# Patient Record
Sex: FEMALE | Race: ASIAN | NOT HISPANIC OR LATINO | Employment: UNEMPLOYED
[De-identification: names, ages, dates, MRNs, and addresses within clinical notes are randomized per-mention and may not be internally consistent; named-entity substitution may affect disease eponyms.]

---

## 2019-03-26 ENCOUNTER — RECORDS - HEALTHEAST (OUTPATIENT)
Dept: ADMINISTRATIVE | Facility: OTHER | Age: 39
End: 2019-03-26

## 2019-04-09 ENCOUNTER — RECORDS - HEALTHEAST (OUTPATIENT)
Dept: ADMINISTRATIVE | Facility: OTHER | Age: 39
End: 2019-04-09

## 2019-07-09 ENCOUNTER — RECORDS - HEALTHEAST (OUTPATIENT)
Dept: ADMINISTRATIVE | Facility: OTHER | Age: 39
End: 2019-07-09

## 2019-07-10 ENCOUNTER — COMMUNICATION - HEALTHEAST (OUTPATIENT)
Dept: ADMINISTRATIVE | Facility: CLINIC | Age: 39
End: 2019-07-10

## 2019-07-18 ENCOUNTER — AMBULATORY - HEALTHEAST (OUTPATIENT)
Dept: EDUCATION SERVICES | Facility: CLINIC | Age: 39
End: 2019-07-18

## 2019-07-18 DIAGNOSIS — O24.410 DIET CONTROLLED GESTATIONAL DIABETES MELLITUS (GDM), ANTEPARTUM: ICD-10-CM

## 2019-07-31 ENCOUNTER — AMBULATORY - HEALTHEAST (OUTPATIENT)
Dept: EDUCATION SERVICES | Facility: CLINIC | Age: 39
End: 2019-07-31

## 2019-07-31 DIAGNOSIS — O24.419 DM (DIABETES MELLITUS), GESTATIONAL, ANTEPARTUM: ICD-10-CM

## 2019-08-08 ENCOUNTER — AMBULATORY - HEALTHEAST (OUTPATIENT)
Dept: EDUCATION SERVICES | Facility: CLINIC | Age: 39
End: 2019-08-08

## 2019-08-08 ENCOUNTER — COMMUNICATION - HEALTHEAST (OUTPATIENT)
Dept: EDUCATION SERVICES | Facility: CLINIC | Age: 39
End: 2019-08-08

## 2019-08-08 DIAGNOSIS — O24.414 INSULIN CONTROLLED GESTATIONAL DIABETES MELLITUS (GDM) IN THIRD TRIMESTER: ICD-10-CM

## 2019-08-12 ENCOUNTER — COMMUNICATION - HEALTHEAST (OUTPATIENT)
Dept: ENDOCRINOLOGY | Facility: CLINIC | Age: 39
End: 2019-08-12

## 2019-08-12 DIAGNOSIS — O24.414 INSULIN CONTROLLED GESTATIONAL DIABETES MELLITUS (GDM) IN THIRD TRIMESTER: ICD-10-CM

## 2019-08-15 ENCOUNTER — AMBULATORY - HEALTHEAST (OUTPATIENT)
Dept: EDUCATION SERVICES | Facility: CLINIC | Age: 39
End: 2019-08-15

## 2019-08-15 DIAGNOSIS — O24.414 INSULIN CONTROLLED GESTATIONAL DIABETES MELLITUS (GDM) DURING PREGNANCY, ANTEPARTUM: ICD-10-CM

## 2019-08-22 ENCOUNTER — OFFICE VISIT - HEALTHEAST (OUTPATIENT)
Dept: ENDOCRINOLOGY | Facility: CLINIC | Age: 39
End: 2019-08-22

## 2019-08-22 DIAGNOSIS — O24.414 INSULIN CONTROLLED GESTATIONAL DIABETES MELLITUS (GDM) IN THIRD TRIMESTER: ICD-10-CM

## 2019-08-22 ASSESSMENT — MIFFLIN-ST. JEOR: SCORE: 1334.04

## 2019-08-28 ENCOUNTER — AMBULATORY - HEALTHEAST (OUTPATIENT)
Dept: MATERNAL FETAL MEDICINE | Facility: HOSPITAL | Age: 39
End: 2019-08-28

## 2019-08-28 ENCOUNTER — OFFICE VISIT - HEALTHEAST (OUTPATIENT)
Dept: MATERNAL FETAL MEDICINE | Facility: HOSPITAL | Age: 39
End: 2019-08-28

## 2019-08-28 ENCOUNTER — RECORDS - HEALTHEAST (OUTPATIENT)
Dept: ADMINISTRATIVE | Facility: OTHER | Age: 39
End: 2019-08-28

## 2019-08-28 ENCOUNTER — RECORDS - HEALTHEAST (OUTPATIENT)
Dept: ULTRASOUND IMAGING | Facility: HOSPITAL | Age: 39
End: 2019-08-28

## 2019-08-28 DIAGNOSIS — O26.90 PREGNANCY, ANTEPARTUM, COMPLICATIONS: ICD-10-CM

## 2019-08-28 DIAGNOSIS — O26.90 PREGNANCY RELATED CONDITIONS, UNSPECIFIED, UNSPECIFIED TRIMESTER: ICD-10-CM

## 2019-08-28 DIAGNOSIS — O24.414 INSULIN CONTROLLED GESTATIONAL DIABETES MELLITUS (GDM) IN THIRD TRIMESTER: ICD-10-CM

## 2019-08-28 DIAGNOSIS — O36.63X0 MACROSOMIA OF FETUS AFFECTING MANAGEMENT OF MOTHER IN THIRD TRIMESTER, SINGLE OR UNSPECIFIED FETUS: ICD-10-CM

## 2019-08-29 ENCOUNTER — AMBULATORY - HEALTHEAST (OUTPATIENT)
Dept: ENDOCRINOLOGY | Facility: CLINIC | Age: 39
End: 2019-08-29

## 2019-08-29 DIAGNOSIS — O24.414 INSULIN CONTROLLED GESTATIONAL DIABETES MELLITUS (GDM) IN THIRD TRIMESTER: ICD-10-CM

## 2019-09-03 ENCOUNTER — ANESTHESIA - HEALTHEAST (OUTPATIENT)
Dept: OBGYN | Facility: HOSPITAL | Age: 39
End: 2019-09-03

## 2019-09-04 ENCOUNTER — SURGERY - HEALTHEAST (OUTPATIENT)
Dept: OBGYN | Facility: HOSPITAL | Age: 39
End: 2019-09-04

## 2019-09-04 ASSESSMENT — MIFFLIN-ST. JEOR: SCORE: 1317.26

## 2019-09-06 ENCOUNTER — HOME CARE/HOSPICE - HEALTHEAST (OUTPATIENT)
Dept: HOME HEALTH SERVICES | Facility: HOME HEALTH | Age: 39
End: 2019-09-06

## 2021-05-30 NOTE — TELEPHONE ENCOUNTER
Date: 7/18/2019 Status: Mihai   Time: 3:00 PM Length: 60   Visit Type: CONSULT [[id number]] Copay: $0.00   Provider: Lindy Mustafa Diabetes Ed Department: [facility] DIABETES EDUCATION   Referring Provider:   ANABELLE: [id number]   Notes: Referring Provider: Dr. Brumfield  DX: Gestational Diabetes

## 2021-05-30 NOTE — TELEPHONE ENCOUNTER
External - [facility]   Referring Provider: Dr. Brumfield  DX: Gestational Diabetes     *Per patient best time to reach the patient is after 3pm    Ref./rec. Were received in DM consult folder on 07.09.2019 @ 4:49 & 4:58.

## 2021-05-30 NOTE — PATIENT INSTRUCTIONS - HE
1.will check blood glucose 4x/day: fasting and one hour after each meal.  2. Will check ketones every am upon waking.  3. Will continue with three meals/day.

## 2021-05-31 ENCOUNTER — RECORDS - HEALTHEAST (OUTPATIENT)
Dept: ADMINISTRATIVE | Facility: CLINIC | Age: 41
End: 2021-05-31

## 2021-05-31 NOTE — PROGRESS NOTES
In person  present for MFM appt. Jethro Smith. [place] [employer]. Did not provide an ID number.

## 2021-05-31 NOTE — TELEPHONE ENCOUNTER
Date: 8/22/2019 Status: Mihai   Time: 1:40 PM Length: 20   Visit Type: DE/ENDO OFFICE VISIT [[id number]] Copay: $0.00   Provider: Cindy Miner NP Department: [facility]   Referring Provider: DARLEEN PITTMAN CSN: [id number]   Notes: [facility] clinic

## 2021-05-31 NOTE — PROGRESS NOTES
[facility] GDM Care Plan    Assessment: pt seen today for f/u.  is present as well. Pt brings in BG log:   FB, 116, 128, 133, 114, 106, 122, 117  B: 176, 209, 141, 147, 175, 172, 164, 147  L: 176, 226, 130, 155, 216, 140, 143  D: 157, 205, 170, 175, 118, 151    Pt is eating 3 meals/day. She is usually eating about 1 cup of rice, some meat and veggies per each meal. She feels her meals are consistent in portions and very similar. She may sometimes have brothers noodles instead of rice. Pt reports ketones are WNL.   Discussed the importance of keeping BG under control. She confirms FBG are fasting and pp are about 1-1.5 hours after meals.   Insulin was sent on  to the pharmacy, pt states she was not able to . Called [facility] who claims they never received NPH and humalog is not covered. Per insurance it appears Basalgar and Admelog are covered. Sent in for Basaglar 10 units at HS and Admelog at 3 units prior to meals. Reviewed how to use insulin pens. Did not review titration instructions as I felt this would be too much for pt today. Reviewed signs and symptoms of hypoglycemia and treatment. Pt was not able to f/u in PC next week due to schedule. She will f/u with Lindy next week on 8/15 as scheduled and in PC  as scheduled.       Visit Type: GDM Individual Follow-up  Time: 30  Visit #: 3  Provider: Brissa   Provider's Diagnosis (per referral form): Gestational (648.83)  Weight: 154 lb 4.8 oz (70 kg)  OGTT: No results found for this or any previous visit.   Estimated Date of Delivery: 19  Pregnancy #:   Previous GDM: No   Medications:   Current Outpatient Medications:      HUMULIN N NPH INSULIN KWIKPEN 100 unit/mL (3 mL) pen, Give 8 units under the skin at bedtime., Disp: 5 adj dose pen, Rfl: PRN     insulin lispro (HUMALOG KWIKPEN INSULIN) 100 unit/mL pen, Inject 2 units under the skin with meals., Disp: 5 adj dose pen, Rfl: 1     pen needle, diabetic (BD ULTRA-FINE MAICOL PEN NEEDLE) 32  "gauge x 5/32\" Ndle, Use one pen needle per insulin injection, four times per day., Disp: 200 each, Rfl: 2    BG monitoring goals: Fasting <95; 1 hour post start of meal <140. Test 4 x per day.  Check fasting a.m. ketones: Yes  GDM meal pattern/carb counting taught per guidelines: Yes    Past Goals:  Nutrition: GDM meal plan MET  Activity: Walking after meals when able/staying active MET  Monitoring: BG 4x/day as directed, ketones every morning MET      New Goals:  Nutrition: GDM meal plan   Activity: Walking after meals when able/staying active   Monitoring: BG 4x/day as directed, ketones every morning    DIABETES CARE PLAN AND EDUCATION RECORD    Gestational Diabetes Disease Process/Preconception Care/Management During Pregnancy/Postpartum:Discussed    Meter (per above goals): Discussed    Nutrition Management    Weight: Assessed and Discussed  Portions/Balance: Assessed and Discussed  Carb ID/Count: Assessed and Discussed  Label Reading: Assessed and Discussed  Menu Planning: Assessed and Discussed  Dining Out: Assessed and Discussed  Physical Activity: Assessed and Discussed    Acute Complications: Prevent, Detect, Treat:    Goal Setting and Problem Solving: Assessed and Discussed  Barriers: Assessed and Discussed  Psychosocial Adjustments: Assessed and Discussed    I agree with the aforementioned diabetes plan.  Cindy HANKS [facility]  8/12/2019  12:05 PM      "

## 2021-05-31 NOTE — PROGRESS NOTES
[facility]  ENDOCRINOLOGY    Gestational Diabetes 2019    May Bisi, 1980, [id number]          Reason for visit      1. Insulin controlled gestational diabetes mellitus (GDM) in third trimester        HPI     May Bisi is a very pleasant 38 y.o. old female who presents for GESTATIONAL Diabetes Mellitus.  She is currently 37  weeks pregnant . Due date is 9/10/19  Diagnosed with GDM based on an OGTT. She hasnot had  GDM in prior pregnancies.   Current carbohydrate intake:consistent with recommendations of 30g-60g-60g.  I have reviewed her blood glucose logs and note that the:  Fasting readings  are:above range on current regimen  Postprandial readings are:in range on current regimen  Current Basaglar dose: 14  Current Prandial insulin:    Blood glucose logs/meter brought in and data reviewed and incorporated into decision-making.  Planned delivery at: [facility]  OBGYN: Rene Miranda    Therapy/Interventions in the past:  She has been seen by the Diabetes Educator- and has received instruction on carbohydrate counting and  consistency.  Records from referring provider and other sources have also been reviewed and incorporated into decision-making.      TODAY:  May is here today for the first and last time prior to Delivery, after starting insulin for GDM. She is here with a professional . She brings her log book and she continues to have elevations in FBS. Basaglar dosage will be increased.  She is having PP elevations, especially after larger rice portions, fried food, Ramen and Montse.  Directed to watch the former, and to take 10 units with the latter. Baby is moving appropriately and she is having some mild swelling. Postpartum instructions given and all questions answered to her satisfaction.     Past Medical History       Patient Active Problem List   Diagnosis     Acute Upper Respiratory Infection     Acute Pharyngitis     Anemia     Hydronephrosis        Past Surgical History     Past  Surgical History:   Procedure Laterality Date     DC  DELIVERY ONLY      Description:  Section;  Proc Date: 10/14/2010;  Comments: 1LTCS for Twins - baby B breech.       Family History     History reviewed. No pertinent family history.    Social History     Social History     Tobacco Use     Smoking status: Not on file   Substance Use Topics     Alcohol use: Not on file     Drug use: Not on file       Review of Systems     Patient has no polyuria or polydipsia, no chest pain, dyspnea or TIA's, no numbness, tingling or pain in extremities  Remainder negative except as noted in HPI.      Vital Signs     /72   Ht 5' (1.524 m)   Wt 163 lb 11.2 oz (74.3 kg)   BMI 31.97 kg/m    Wt Readings from Last 3 Encounters:   19 163 lb 11.2 oz (74.3 kg)   08/15/19 158 lb (71.7 kg)   19 154 lb 4.8 oz (70 kg)       Physical Exam     GENERAL: Pleasant, alert, appropriate appearance for age. No acute distress,   HEENT: Normocephalic, atraumatic  NECK: Supple, no masses or  lymph nodes.  THYROID: No nodules or enlargement. Non-tender, no bruit  CHEST/RESPIRATORY: Normal chest wall and respirations. Clear to auscultation.  CARDIOVASCULAR: Regular rate and rhythm. S1, S2, no murmur, click, gallop, or rubs.  ABDOMEN: Gravid   LYMPHATIC: No palpable nodes in neck, supraclavicular,  SKIN: No melanosis,  ecchymosis,  vitiligo. No acanthosis nigricans  NEURO:  Non-focal, normal DTRs; no tremor.  PSYCH: Alert and oriented -appropriate affect. Orientation, judgement and memory appear intact.  MSK: No joint abnormalities, FROM in all four extremities. No kyphosis    Assessment     1. Insulin controlled gestational diabetes mellitus (GDM) in third trimester        Plan     1. GESTATIONAL DIABETES-  Adjust dose as follows:    -Basaglar [id number]   units. Increase by 2 units every 2 days to keep fasting blood glucose below 95mg/dL  -Novolog 6  units with breakfast  -Novolog 6 units with lunch   -Novolog 6 units  with dinner  Will increase to 10 units with Montse or Livan fu  -Increase by 2 units every 2 days to keep 1 hour after meal blood glucose less than 140mg/dL    We reviewed glucose goals of fasting blood glucose <95 mg/dL and 1 hour post prandial blood glucose of <140 mg/dL.    Monitor blood sugar 4 times daily: Fasting  and 1 hour after each meal.  Contact  this clinic [phone] if blood glucose is not within the above-mentioned goals.     We discussed the importance of excellent glycemic control during pregnancy to limit complications such as fetal macrosomia, shoulder dystocia,  hypoglycemia and hyperbilirubinemia.  I have discussed the patient's increased risk of recurrent GDM and/or development of type 2 diabetes later in life.        F/up with A1c 3 months postpartum with PCP.    May be a candidate for metformin postpartum as she has more than 1 risk factor for future Type 2 Diabetes Mellitus.    She will need a screening A1c at least annually, TLC- including carbohydrate control and exercise.    After you deliver the baby, it is suggested to check your blood sugar occasionally (1 - 2 times per week) for 6 weeks.     When you are not pregnant, normal blood sugars are:       Fasting (before eating anything in the morning)  - under 100 mg/dl    2 hours after meals under 140  The American Diabetes Association recommends:     a glucose tolerance test 6 weeks after you deliver the baby.         a hemoglobin Alc test yearly after delivery.  The Alc test is a 2-3 month average blood sugar reading.        Discuss getting these tests with your provider.       After delivery, and your provider has said that it is safe to be active, work toward getting 150 minutes each week of physical activity to decrease your risk of  getting type 2 diabetes.       Maintaining a healthy body weight will also decrease your risk of getting type 2 diabetes.           Cindy Miner  2019      Lab Results     Creatinine  "  Date Value Ref Range Status   11/16/2016 0.63 0.60 - 1.10 mg/dL Final   10/14/2010 0.56 (L) 0.60 - 1.10 mg/dL Final       No results found for: CHOL, HDL, LDLCALC, TRIG    No results found for: ALT, AST, GGT, ALKPHOS, BILITOT      Current Medications     Outpatient Medications Prior to Visit   Medication Sig Dispense Refill     blood glucose test (CONTOUR NEXT TEST STRIPS) strips Use 1 each As Directed 4 (four) times a day. 150 strip 1     generic lancets Use 1 each As Directed 4 (four) times a day. 100 each 1     insulin aspart U-100 (NOVOLOG PENFILL U-100 INSULIN) 100 unit/mL Crtg Inject 3 Units under the skin 3 (three) times a day. (Patient taking differently: Inject 6 Units under the skin 3 (three) times a day.       ) 5 mL 0     pen needle, diabetic (BD ULTRA-FINE MAICOL PEN NEEDLE) 32 gauge x 5/32\" Ndle Use one pen needle per insulin injection, four times per day. 200 each 2     insulin glargine (LANTUS; BASAGLAR) 100 unit/mL (3 mL) pen Inject 10 Units under the skin at bedtime. (Patient taking differently: Inject 14 Units under the skin at bedtime.       ) 5 adj dose pen 0     insulin lispro (ADMELOG SOLOSTAR U-100 INSULIN) 100 unit/mL pen Inject 3 Units under the skin 3 (three) times a day. Before meals 5 adj dose pen 0     No facility-administered medications prior to visit.        "

## 2021-05-31 NOTE — PROGRESS NOTES
NST performed due to BPP of 6/8.  Dr. Weiss reviewed efm tracing. See NST/BPP Doc Flowsheet tab.

## 2021-05-31 NOTE — PROGRESS NOTES
"Please see \"Imaging\" tab under \"Chart Review\" for details of today's visit.    Pina Weiss        "

## 2021-06-01 ENCOUNTER — RECORDS - HEALTHEAST (OUTPATIENT)
Dept: ADMINISTRATIVE | Facility: CLINIC | Age: 41
End: 2021-06-01

## 2021-06-01 NOTE — ANESTHESIA PROCEDURE NOTES
Peripheral Block    Patient location during procedure: OR  Start time: 9/4/2019 2:09 PM  End time: 9/4/2019 2:16 PM  post-op analgesia per surgeon order as noted in medical record  Staffing:  Performing  Anesthesiologist: Olga Lundy MD  Preanesthetic Checklist  Completed: patient identified, site marked, risks, benefits, and alternatives discussed, timeout performed, consent obtained, airway assessed, oxygen available, suction available, emergency drugs available and hand hygiene performed  Peripheral Block  Block type: other, TAP  Prep: ChloraPrep  Patient position: supine  Laterality: bilateral, same technique used bilaterally  Injection technique: ultrasound guided      Permanent ultrasound image captured for medical record  Sterile gel and probe cover used for ultrasound.    Needle  Needle type: Stimuplex   Needle gauge: 21 G  Needle length: 4 in  no peripheral nerve catheter placed  Assessment  Injection assessment: no difficulty with injection, negative aspiration for heme and incremental injection

## 2021-06-01 NOTE — ANESTHESIA CARE TRANSFER NOTE
Last vitals:   Vitals:    09/04/19 1441   BP: 99/57   Pulse: 73   Resp: 16   Temp: 36.2  C (97.2  F)   SpO2: 100%     Patient's level of consciousness is drowsy  Spontaneous respirations: yes  Maintains airway independently: yes  Dentition unchanged: yes  Oropharynx: oropharynx clear of all foreign objects    QCDR Measures:  ASA# 20 - Surgical Safety Checklist: WHO surgical safety checklist completed prior to induction    PQRS# 430 - Adult PONV Prevention: 4558F - Pt received => 2 anti-emetic agents (different classes) preop & intraop  ASA# 8 - Peds PONV Prevention: NA - Not pediatric patient, not GA or 2 or more risk factors NOT present  PQRS# 424 - Nathaly-op Temp Management: 4559F - At least one body temp DOCUMENTED => 35.5C or 95.9F within required timeframe  PQRS# 426 - PACU Transfer Protocol: - Transfer of care checklist used  ASA# 14 - Acute Post-op Pain: ASA14B - Patient did NOT experience pain >= 7 out of 10

## 2021-06-01 NOTE — ANESTHESIA PROCEDURE NOTES
Spinal Block    Patient location during procedure: OR  Start time: 9/4/2019 1:40 PM  End time: 9/4/2019 1:48 PM  Reason for block: primary anesthetic    Staffing:  Performing  Anesthesiologist: Olga Lundy MD    Preanesthetic Checklist  Completed: patient identified, risks, benefits, and alternatives discussed, timeout performed, consent obtained, airway assessed, oxygen available, suction available, emergency drugs available and hand hygiene performed  Spinal Block  Patient position: sitting  Prep: ChloraPrep and site prepped and draped  Patient monitoring: heart rate, continuous pulse ox, blood pressure and cardiac monitor  Approach: midline  Location: L3-4  Injection technique: single-shot  Needle type: pencil-tip   Needle gauge: 24 G    Assessment  Sensory level: T4

## 2021-06-01 NOTE — ANESTHESIA POSTPROCEDURE EVALUATION
Patient: May Bisi   SECTION, REPEAT  Anesthesia type: spinal    Patient location: Labor and Delivery  Last vitals:   Vitals Value Taken Time   /75 2019  6:08 PM   Temp 36.2  C (97.2  F) 2019  2:41 PM   Pulse 72 2019  9:20 PM   Resp 16 2019  6:08 PM   SpO2 96 % 2019  9:20 PM   Vitals shown include unvalidated device data.  Post vital signs: stable  Level of consciousness: awake and responds to simple questions  Post-anesthesia pain: pain controlled  Post-anesthesia nausea and vomiting: no  Pulmonary: unassisted, return to baseline  Cardiovascular: stable and blood pressure at baseline  Hydration: adequate  Anesthetic events: no    QCDR Measures:  ASA# 11 - Nathaly-op Cardiac Arrest: ASA11B - Patient did NOT experience unanticipated cardiac arrest  ASA# 12 - Nathaly-op Mortality Rate: ASA12B - Patient did NOT die  ASA# 13 - PACU Re-Intubation Rate: ASA13B - Patient did NOT require a new airway mgmt  ASA# 10 - Composite Anes Safety: ASA10A - No serious adverse event    Additional Notes:    Neuraxial block has worn off, no residual numbness or weakness. Pain controlled and reports 0/10. Endorses some nausea. Denies headache or back pain. No further questions or concerns. Instructed that we are available  should she have questions pertaining to her spinal.

## 2021-06-01 NOTE — ANESTHESIA PREPROCEDURE EVALUATION
Anesthesia Evaluation      Patient summary reviewed   No history of anesthetic complications     Airway   Mallampati: II  Neck ROM: full   Pulmonary - negative ROS and normal exam    breath sounds clear to auscultation  (-) not a smoker                         Cardiovascular - negative ROS and normal exam  (-) hypertension  Rhythm: regular  Rate: normal,         Neuro/Psych - negative ROS     Endo/Other    (+) diabetes mellitus using insulin, pregnant  (-) no obesity     Comments: GDM    GI/Hepatic/Renal    (+) GERD,   chronic renal disease,           Dental - normal exam                        Anesthesia Plan  Planned anesthetic: peripheral nerve block and spinal    ASA 2   Induction: intravenous   Anesthetic plan and risks discussed with: patient, spouse, child/children and  services used  Anesthesia plan special considerations: antiemetics,   Post-op plan: other (ITN orders)

## 2021-06-02 ENCOUNTER — RECORDS - HEALTHEAST (OUTPATIENT)
Dept: ADMINISTRATIVE | Facility: CLINIC | Age: 41
End: 2021-06-02

## 2021-06-03 VITALS — WEIGHT: 160 LBS | BODY MASS INDEX: 31.41 KG/M2 | HEIGHT: 60 IN

## 2021-06-03 VITALS — BODY MASS INDEX: 32.14 KG/M2 | WEIGHT: 163.7 LBS | HEIGHT: 60 IN

## 2021-06-03 VITALS — WEIGHT: 154.3 LBS

## 2021-06-03 VITALS — WEIGHT: 155 LBS

## 2021-06-03 VITALS — WEIGHT: 158 LBS

## 2021-06-16 PROBLEM — O33.9 CPD (CEPHALO-PELVIC DISPROPORTION): Status: ACTIVE | Noted: 2019-09-04

## 2021-06-19 NOTE — LETTER
Letter by Cait Mckeon RN at      Author: Cait Mckeon RN Service: -- Author Type: --    Filed:  Encounter Date: 8/8/2019 Status: (Other)                    August 8, 2019    Patient: Kaleigh Mathews   MR Number: [id number]   YOB: 1980   Date of Visit: 8/8/2019     To whom it may concern,     Patient will need at least weekly clinic appointments, possibly more often. She is being seen for gestational diabetes which is requiring frequent visits to adjust insulin and make sure blood sugars are well controlled for the health of herself and baby.         Cait Mckeon RN, CDE

## 2021-07-03 NOTE — ADDENDUM NOTE
Addendum Note by Abdullahi Arrieta Diabetes Ed at 7/31/2019  3:00 PM     Author: Abdullahi Arrieta Diabetes Ed Service: -- Author Type: Diabetes Ed    Filed: 8/1/2019 12:34 PM Encounter Date: 7/31/2019 Status: Signed    : Abdullahi Arrieta Diabetes Ed (Diabetes Ed)    Addended by: ABDULLAHI ARRIETA on: 8/1/2019 12:34 PM        Modules accepted: Orders

## 2021-07-03 NOTE — ADDENDUM NOTE
Addendum Note by David Miner NP at 7/31/2019  3:00 PM     Author: David Miner NP Service: -- Author Type: Nurse Practitioner    Filed: 8/1/2019 11:55 AM Encounter Date: 7/31/2019 Status: Signed    : David Miner NP (Nurse Practitioner)    Addended by: DAVID MINER on: 8/1/2019 11:55 AM        Modules accepted: Orders

## 2021-07-03 NOTE — ADDENDUM NOTE
Addendum Note by Abdullahi Arrieta Diabetes Ed at 7/31/2019  3:00 PM     Author: Abdullahi Arrieta Diabetes Ed Service: -- Author Type: Diabetes Ed    Filed: 8/1/2019 10:11 AM Encounter Date: 7/31/2019 Status: Signed    : Abdullahi Arrieta Diabetes Ed (Diabetes Ed)    Addended by: ABDULLAHI ARRIETA on: 8/1/2019 10:11 AM        Modules accepted: Orders

## 2023-10-16 ENCOUNTER — OFFICE VISIT (OUTPATIENT)
Dept: FAMILY MEDICINE | Facility: CLINIC | Age: 43
End: 2023-10-16
Payer: COMMERCIAL

## 2023-10-16 VITALS
HEART RATE: 80 BPM | SYSTOLIC BLOOD PRESSURE: 135 MMHG | WEIGHT: 142 LBS | DIASTOLIC BLOOD PRESSURE: 95 MMHG | OXYGEN SATURATION: 97 % | BODY MASS INDEX: 27.73 KG/M2 | TEMPERATURE: 98.5 F | RESPIRATION RATE: 18 BRPM

## 2023-10-16 DIAGNOSIS — Z30.9 ENCOUNTER FOR CONTRACEPTIVE MANAGEMENT, UNSPECIFIED TYPE: ICD-10-CM

## 2023-10-16 DIAGNOSIS — R35.0 URINARY FREQUENCY: Primary | ICD-10-CM

## 2023-10-16 PROCEDURE — 99203 OFFICE O/P NEW LOW 30 MIN: CPT | Performed by: STUDENT IN AN ORGANIZED HEALTH CARE EDUCATION/TRAINING PROGRAM

## 2023-10-16 NOTE — PROGRESS NOTES
Assessment & Plan     Urinary frequency  - Ob/Gyn  Referral; Future  - Physical Therapy Referral; Future    Encounter for contraceptive management, unspecified type  Patient would like to discuss tubal ligation.  She declines other contraception methods.  - Ob/Gyn  Referral; Future                 No follow-ups on file.    Serjio Gustafson MD  [facility]    Subjective   May is a 42 year old, presenting for the following health issues:  Bladder Problems (Feel like uterus is going down and can't hold pee and been going on  for 2 years ) and Other (Pregancy prevention)      10/16/2023     9:22 AM   Additional Questions   Roomed by ML   Accompanied by self       HPI           Patient is requesting referral to discuss tubal ligation.  She is done having children.  She declines medication, IUD and Nexplanon.     She reports pressure on her bladder which she is concerned for the uterus.  She is not interested in PT.  She does not have any dysuria or changes in urination.  No increase in frequency or incontinence.    Review of Systems   Positive ROS was noted in the HPI, otherwise negative.         Objective    BP (!) 135/95 (BP Location: Left arm, Patient Position: Sitting, Cuff Size: Adult Regular)   Pulse 80   Temp 98.5  F (36.9  C) (Oral)   Resp 18   Wt 64.4 kg (142 lb)   LMP 10/06/2023 (Exact Date)   SpO2 97%   BMI 27.73 kg/m    Body mass index is 27.73 kg/m .  Physical Exam  Constitutional:       Appearance: Normal appearance.   HENT:      Head: Normocephalic.      Nose: Nose normal.      Mouth/Throat:      Mouth: Mucous membranes are moist.   Eyes:      Extraocular Movements: Extraocular movements intact.      Conjunctiva/sclera: Conjunctivae normal.   Cardiovascular:      Rate and Rhythm: Normal rate and regular rhythm.   Pulmonary:      Effort: Pulmonary effort is normal.      Breath sounds: Normal breath sounds.   Abdominal:      Palpations: Abdomen is soft.    Musculoskeletal:      Cervical back: Normal range of motion.   Skin:     General: Skin is warm.      Capillary Refill: Capillary refill takes less than 2 seconds.   Neurological:      General: No focal deficit present.      Mental Status: She is alert.

## 2023-12-12 ENCOUNTER — OFFICE VISIT (OUTPATIENT)
Dept: OBGYN | Facility: CLINIC | Age: 43
End: 2023-12-12
Payer: COMMERCIAL

## 2023-12-12 ENCOUNTER — PREP FOR PROCEDURE (OUTPATIENT)
Dept: OBGYN | Facility: CLINIC | Age: 43
End: 2023-12-12

## 2023-12-12 ENCOUNTER — HOSPITAL ENCOUNTER (OUTPATIENT)
Facility: AMBULATORY SURGERY CENTER | Age: 43
End: 2023-12-12
Attending: OBSTETRICS & GYNECOLOGY
Payer: COMMERCIAL

## 2023-12-12 VITALS
WEIGHT: 144 LBS | HEIGHT: 60 IN | BODY MASS INDEX: 28.27 KG/M2 | SYSTOLIC BLOOD PRESSURE: 128 MMHG | OXYGEN SATURATION: 99 % | HEART RATE: 81 BPM | DIASTOLIC BLOOD PRESSURE: 80 MMHG

## 2023-12-12 DIAGNOSIS — Z30.09 UNWANTED FERTILITY: Primary | ICD-10-CM

## 2023-12-12 DIAGNOSIS — Z30.09 STERILIZATION CONSULT: ICD-10-CM

## 2023-12-12 PROCEDURE — 99203 OFFICE O/P NEW LOW 30 MIN: CPT | Performed by: OBSTETRICS & GYNECOLOGY

## 2024-01-24 NOTE — PROGRESS NOTES
CC: The patient is being seen secondary to a desire for no more pregnancies.    HPI: The pt is a 43 year old MAF  who presents with knowing she doesn't want any more children.  She is seen with a professional [facility]  on the phone.  She has had 2 prior  deliveries; the rest were vaginal.    No past medical history on file.    Past Surgical History:   Procedure Laterality Date     SECTION N/A 2019    Procedure:  SECTION, REPEAT;  Surgeon: Rhett Brumfield MD;  Location: [facility] OR;  Service: Obstetrics    [facility]  DELIVERY ONLY      Description:  Section;  Proc Date: 10/14/2010;  Comments: 1LTCS for Twins - baby B breech.       Patient's   Family History   Problem Relation Age of Onset    No Known Problems Mother     No Known Problems Father     No Known Problems Sister     No Known Problems Sister     No Known Problems Son     No Known Problems Son     No Known Problems Son     No Known Problems Son     No Known Problems Son     No Known Problems Daughter     No Known Problems Daughter     No Known Problems Daughter     No Known Problems Daughter        Patient   Social History     Socioeconomic History    Marital status:    Tobacco Use    Smoking status: Never    Smokeless tobacco: Never   Substance and Sexual Activity    Alcohol use: Never    Drug use: Never    Sexual activity: Yes     Partners: Male     Social Determinants of Health     Interpersonal Safety: Low Risk  (10/16/2023)    Interpersonal Safety     Do you feel physically and emotionally safe where you currently live?: Yes     Within the past 12 months, have you been hit, slapped, kicked or otherwise physically hurt by someone?: No     Within the past 12 months, have you been humiliated or emotionally abused in other ways by your partner or ex-partner?: No       Outpatient Medications Prior to Visit   Medication Sig Dispense Refill    ibuprofen (ADVIL,MOTRIN) 600 MG tablet [IBUPROFEN  (ADVIL,MOTRIN) 600 MG TABLET] Take 1 tablet (600 mg total) by mouth every 6 (six) hours as needed for pain. (Patient not taking: Reported on 10/16/2023) 30 tablet 0    oxyCODONE (ROXICODONE) 5 MG immediate release tablet [OXYCODONE (ROXICODONE) 5 MG IMMEDIATE RELEASE TABLET] Take 1 tablet (5 mg total) by mouth every 6 (six) hours as needed for pain. (Patient not taking: Reported on 10/16/2023) 12 tablet 0     No facility-administered medications prior to visit.       Patient has No Known Allergies.    ROS:  12 part ROS is negative aside from those symptoms in the HPI    PE:  /80 (BP Location: Right arm, Patient Position: Sitting, Cuff Size: Adult Regular)   Pulse 81   Ht 1.524 m (5')   Wt 65.3 kg (144 lb)   LMP 11/27/2023 (Exact Date)           Body mass index is 28.12 kg/m .    General: overweight AF, NAD  Psych: normal mood  Neuro: CN I-XII grossly intact  MS: normal gait    Assessment: 43 year old MAF  with a desire for permanent contraception.    Plan: LARCs were discussed with the patient.  Laparoscopic tubal ligation was discussed with the patient.  We discussed that most of the time the surgery is actually a bilateral salpingectomy to help reduce the risk of ovarian cancer.  She was counseled on the permanence of the procedure, the approximately 1/200 failure rate and the increased risk for ectopic should pregnancy occur.  She was counseled on the pros and cons of LTL including immediate effect but general anesthesia and 3 incisions.  Questions were answered.  She will be notified when the procedure is scheduled.  She was counseled on the need for someone to drive her home and stay with her after surgery, the need for a pre-op exam prior to the surgery, the need to have nothing to eat for 8 hour prior to surgery and nothing to drink after 2 hours prior to surgery, and that the surgery center would call her a day or two before the procedure to go over details of the process and what time to  arrive.  She signed the MA consent form today.

## 2024-02-16 ENCOUNTER — OFFICE VISIT (OUTPATIENT)
Dept: MIDWIFE SERVICES | Facility: CLINIC | Age: 44
End: 2024-02-16
Payer: COMMERCIAL

## 2024-02-16 VITALS — SYSTOLIC BLOOD PRESSURE: 128 MMHG | DIASTOLIC BLOOD PRESSURE: 84 MMHG

## 2024-02-16 DIAGNOSIS — Z86.32 HISTORY OF DIET CONTROLLED GESTATIONAL DIABETES MELLITUS (GDM): ICD-10-CM

## 2024-02-16 DIAGNOSIS — Z00.00 HEALTHCARE MAINTENANCE: ICD-10-CM

## 2024-02-16 DIAGNOSIS — Z01.818 PREOP GENERAL PHYSICAL EXAM: Primary | ICD-10-CM

## 2024-02-16 DIAGNOSIS — Z30.09 UNWANTED FERTILITY: ICD-10-CM

## 2024-02-16 DIAGNOSIS — Z11.3 SCREEN FOR STD (SEXUALLY TRANSMITTED DISEASE): ICD-10-CM

## 2024-02-16 PROBLEM — O33.9 CPD (CEPHALO-PELVIC DISPROPORTION): Status: RESOLVED | Noted: 2019-09-04 | Resolved: 2024-02-16

## 2024-02-16 LAB
ALBUMIN SERPL BCG-MCNC: 4.2 G/DL (ref 3.5–5.2)
ALP SERPL-CCNC: 162 U/L (ref 40–150)
ALT SERPL W P-5'-P-CCNC: 55 U/L (ref 0–50)
ANION GAP SERPL CALCULATED.3IONS-SCNC: 14 MMOL/L (ref 7–15)
AST SERPL W P-5'-P-CCNC: 48 U/L (ref 0–45)
BILIRUB SERPL-MCNC: 0.5 MG/DL
BUN SERPL-MCNC: 8.3 MG/DL (ref 6–20)
CALCIUM SERPL-MCNC: 9.4 MG/DL (ref 8.6–10)
CHLORIDE SERPL-SCNC: 100 MMOL/L (ref 98–107)
CREAT SERPL-MCNC: 0.44 MG/DL (ref 0.51–0.95)
DEPRECATED HCO3 PLAS-SCNC: 23 MMOL/L (ref 22–29)
EGFRCR SERPLBLD CKD-EPI 2021: >90 ML/MIN/1.73M2
ERYTHROCYTE [DISTWIDTH] IN BLOOD BY AUTOMATED COUNT: 11.8 % (ref 10–15)
GLUCOSE SERPL-MCNC: 371 MG/DL (ref 70–99)
HBA1C MFR BLD: 10.8 % (ref 0–5.6)
HCT VFR BLD AUTO: 40.2 % (ref 35–47)
HCV AB SERPL QL IA: NONREACTIVE
HGB BLD-MCNC: 13.9 G/DL (ref 11.7–15.7)
HIV 1+2 AB+HIV1 P24 AG SERPL QL IA: NONREACTIVE
MCH RBC QN AUTO: 28.7 PG (ref 26.5–33)
MCHC RBC AUTO-ENTMCNC: 34.6 G/DL (ref 31.5–36.5)
MCV RBC AUTO: 83 FL (ref 78–100)
PLATELET # BLD AUTO: 334 10E3/UL (ref 150–450)
POTASSIUM SERPL-SCNC: 3.7 MMOL/L (ref 3.4–5.3)
PROT SERPL-MCNC: 7.8 G/DL (ref 6.4–8.3)
RBC # BLD AUTO: 4.85 10E6/UL (ref 3.8–5.2)
SODIUM SERPL-SCNC: 137 MMOL/L (ref 135–145)
TSH SERPL DL<=0.005 MIU/L-ACNC: 1.74 UIU/ML (ref 0.3–4.2)
WBC # BLD AUTO: 8.3 10E3/UL (ref 4–11)

## 2024-02-16 PROCEDURE — 87591 N.GONORRHOEAE DNA AMP PROB: CPT | Performed by: ADVANCED PRACTICE MIDWIFE

## 2024-02-16 PROCEDURE — 84443 ASSAY THYROID STIM HORMONE: CPT | Performed by: ADVANCED PRACTICE MIDWIFE

## 2024-02-16 PROCEDURE — 87491 CHLMYD TRACH DNA AMP PROBE: CPT | Performed by: ADVANCED PRACTICE MIDWIFE

## 2024-02-16 PROCEDURE — 36415 COLL VENOUS BLD VENIPUNCTURE: CPT | Performed by: ADVANCED PRACTICE MIDWIFE

## 2024-02-16 PROCEDURE — 99215 OFFICE O/P EST HI 40 MIN: CPT | Performed by: ADVANCED PRACTICE MIDWIFE

## 2024-02-16 PROCEDURE — 83036 HEMOGLOBIN GLYCOSYLATED A1C: CPT | Performed by: ADVANCED PRACTICE MIDWIFE

## 2024-02-16 PROCEDURE — 80053 COMPREHEN METABOLIC PANEL: CPT | Performed by: ADVANCED PRACTICE MIDWIFE

## 2024-02-16 PROCEDURE — 85027 COMPLETE CBC AUTOMATED: CPT | Performed by: ADVANCED PRACTICE MIDWIFE

## 2024-02-16 PROCEDURE — 86803 HEPATITIS C AB TEST: CPT | Performed by: ADVANCED PRACTICE MIDWIFE

## 2024-02-16 PROCEDURE — 87389 HIV-1 AG W/HIV-1&-2 AB AG IA: CPT | Performed by: ADVANCED PRACTICE MIDWIFE

## 2024-02-16 NOTE — PROGRESS NOTES
Preoperative Evaluation  [address]  [facility] [address]  Phone: [phone]  Fax: [phone]  Primary Provider: Serjio Gustafson  Pre-op Performing Provider:    MEENA LOPEZ,   Feb 16, 2024       May is a 43 year old, presenting for the following:  Pre-Op Exam      Surgical Information  Surgery/Procedure: Bilateral salpingectomy  Surgery Location: [facility]  Surgeon: Dr. Quigley  Surgery Date: 02/28/2024  Time of Surgery: 9:15 AM  Where patient plans to recover: At home with family  Fax number for surgical facility: Note does not need to be faxed, will be available electronically in Epic.    Assessment & Plan     The proposed surgical procedure is considered LOW risk.    Problem List Items Addressed This Visit          Urinary    Unwanted fertility       Other    History of diet controlled gestational diabetes mellitus (GDM)    Relevant Orders    Hemoglobin A1c (Completed)     Other Visit Diagnoses       Preop general physical exam    -  Primary    Relevant Orders    CBC with platelets (Completed)    Comprehensive metabolic panel (BMP + Alb, Alk Phos, ALT, AST, Total. Bili, TP)    TSH with free T4 reflex    Hepatitis C antibody    HIV Antigen Antibody Combo    *MA Screening Digital Bilateral    Healthcare maintenance        Relevant Orders    TSH with free T4 reflex    Hepatitis C antibody    HIV Antigen Antibody Combo    *MA Screening Digital Bilateral    Hemoglobin A1c (Completed)    Screen for STD (sexually transmitted disease)        Relevant Orders    Chlamydia trachomatis/Neisseria gonorrhoeae by PCR - Clinic Collect          Patient is due for her cervical cancer screening and is currently having her menstrual period.  She will RTC in 1 week for Pap smear with HPV and wet prep.     - No identified additional risk factors other than previously addressed      Recommendation  APPROVAL GIVEN to proceed  with proposed procedure, without further diagnostic evaluation.    Ordering of each unique test  45 minutes spent by me on the date of the encounter doing chart review, history and exam, documentation and further activities per the note    Subjective       HPI related to upcoming procedure: Patient desires permanent sterilization           No data to display                  Status of Chronic Conditions:  See problem list for active medical problems.  Problems all longstanding and stable, except as noted/documented.  See ROS for pertinent symptoms related to these conditions.    Patient Active Problem List    Diagnosis Date Noted    History of diet controlled gestational diabetes mellitus (GDM) 2024     Priority: Medium     2019      Unwanted fertility 2023     Priority: Medium      Past Medical History:   Diagnosis Date    Anemia     GDM (gestational diabetes mellitus)      Past Surgical History:   Procedure Laterality Date     SECTION N/A 2019    Procedure:  SECTION, REPEAT;  Surgeon: Rhett Brumfield MD;  Location: [facility] OR;  Service: Obstetrics    [facility]  DELIVERY ONLY      Description:  Section;  Proc Date: 10/14/2010;  Comments: 1LTCS for Twins - baby B breech.     Current Outpatient Medications   Medication Sig Dispense Refill    ibuprofen (ADVIL,MOTRIN) 600 MG tablet [IBUPROFEN (ADVIL,MOTRIN) 600 MG TABLET] Take 1 tablet (600 mg total) by mouth every 6 (six) hours as needed for pain. (Patient not taking: Reported on 10/16/2023) 30 tablet 0    oxyCODONE (ROXICODONE) 5 MG immediate release tablet [OXYCODONE (ROXICODONE) 5 MG IMMEDIATE RELEASE TABLET] Take 1 tablet (5 mg total) by mouth every 6 (six) hours as needed for pain. (Patient not taking: Reported on 10/16/2023) 12 tablet 0       No Known Allergies     Social History     Tobacco Use    Smoking status: Never    Smokeless tobacco: Never   Substance Use Topics    Alcohol use: Never     Family  "History   Problem Relation Age of Onset    No Known Problems Mother     No Known Problems Father     No Known Problems Sister     No Known Problems Sister     No Known Problems Son     No Known Problems Son     No Known Problems Son     No Known Problems Son     No Known Problems Son     No Known Problems Daughter     No Known Problems Daughter     No Known Problems Daughter     No Known Problems Daughter      History   Drug Use Unknown         Review of Systems    Review of Systems  Constitutional, HEENT, cardiovascular, pulmonary, gi and gu systems are negative, except as otherwise noted.  Objective    /84 (BP Location: Right arm, Patient Position: Sitting, Cuff Size: Adult Regular)   LMP 02/16/2024 (Exact Date)    Estimated body mass index is 28.12 kg/m  as calculated from the following:    Height as of 12/12/23: 1.524 m (5').    Weight as of 12/12/23: 65.3 kg (144 lb).  Physical Exam  GENERAL: alert and no distress.  Patient has a right nostril piercing, 1 left earlobe piercing, and 3 right earlobe piercings.  EYES: Eyes grossly normal to inspection, PERRL and conjunctivae and sclerae normal  HENT: ear canals and TM's normal, nose and mouth without ulcers or lesions  NECK: no adenopathy, no asymmetry, masses, or scars  RESP: lungs clear to auscultation - no rales, rhonchi or wheezes  CV: regular rate and rhythm, normal S1 S2, no S3 or S4, no murmur, click or rub, no peripheral edema  ABDOMEN: soft, nontender, no hepatosplenomegaly, no masses and bowel sounds normal  MS: no gross musculoskeletal defects noted, no edema  SKIN: no suspicious lesions or rashes  NEURO: Normal strength and tone, mentation intact and speech normal  PSYCH: mentation appears normal, affect normal/bright    No results for input(s): \"HGB\", \"PLT\", \"INR\", \"NA\", \"POTASSIUM\", \"CR\", \"A1C\" in the last 70294 hours.     Diagnostics  Labs pending at this time.  Results will be reviewed when available.   No EKG required for low risk surgery " (cataract, skin procedure, breast biopsy, etc).    Revised Cardiac Risk Index (RCRI)  The patient has the following serious cardiovascular risks for perioperative complications:   - No serious cardiac risks = 0 points     RCRI Interpretation: 0 points: Class I (very low risk - 0.4% complication rate)         Signed Electronically by: Olga Field CNM  Copy of this evaluation report is provided to requesting physician.

## 2024-02-17 DIAGNOSIS — R73.9 ELEVATED BLOOD SUGAR: ICD-10-CM

## 2024-02-17 DIAGNOSIS — R73.09 ELEVATED HEMOGLOBIN A1C: Primary | ICD-10-CM

## 2024-02-17 LAB
C TRACH DNA SPEC QL PROBE+SIG AMP: NEGATIVE
N GONORRHOEA DNA SPEC QL NAA+PROBE: NEGATIVE

## 2024-02-20 ENCOUNTER — OFFICE VISIT (OUTPATIENT)
Dept: FAMILY MEDICINE | Facility: CLINIC | Age: 44
End: 2024-02-20
Payer: COMMERCIAL

## 2024-02-20 VITALS
OXYGEN SATURATION: 95 % | DIASTOLIC BLOOD PRESSURE: 81 MMHG | WEIGHT: 138.6 LBS | BODY MASS INDEX: 27.07 KG/M2 | SYSTOLIC BLOOD PRESSURE: 123 MMHG | RESPIRATION RATE: 24 BRPM | HEART RATE: 118 BPM | TEMPERATURE: 101.8 F

## 2024-02-20 DIAGNOSIS — J10.1 INFLUENZA B: Primary | ICD-10-CM

## 2024-02-20 DIAGNOSIS — R07.0 THROAT PAIN: ICD-10-CM

## 2024-02-20 DIAGNOSIS — R50.9 FEVER, UNSPECIFIED FEVER CAUSE: ICD-10-CM

## 2024-02-20 LAB
DEPRECATED S PYO AG THROAT QL EIA: NEGATIVE
FLUAV AG SPEC QL IA: NEGATIVE
FLUBV AG SPEC QL IA: POSITIVE
GROUP A STREP BY PCR: NOT DETECTED

## 2024-02-20 PROCEDURE — 99213 OFFICE O/P EST LOW 20 MIN: CPT | Performed by: PHYSICIAN ASSISTANT

## 2024-02-20 PROCEDURE — 87651 STREP A DNA AMP PROBE: CPT | Performed by: PHYSICIAN ASSISTANT

## 2024-02-20 PROCEDURE — 87804 INFLUENZA ASSAY W/OPTIC: CPT | Performed by: PHYSICIAN ASSISTANT

## 2024-02-20 RX ORDER — OSELTAMIVIR PHOSPHATE 75 MG/1
75 CAPSULE ORAL 2 TIMES DAILY
Qty: 10 CAPSULE | Refills: 0 | Status: SHIPPED | OUTPATIENT
Start: 2024-02-20 | End: 2024-02-25

## 2024-02-20 ASSESSMENT — ENCOUNTER SYMPTOMS
CHILLS: 1
HEADACHES: 1
COUGH: 1
FATIGUE: 1
FEVER: 1

## 2024-02-20 NOTE — LETTER
February 20, 2024      Kaleigh Mathews  [address]  [place] [address]        To Whom It May Concern:    Kaleigh Mathews  was seen on 2/20/2024.  Please excuse her absence due to illness. She must remain out of work until she is fever free without the use of Tylenol or Ibuprofen for 24 hours. This may take 1-5 days.         Sincerely,        Cindy Smith PA-C

## 2024-02-21 NOTE — PROGRESS NOTES
Patient presents with:  Cough: X Sunday. Chills, body aches, fatigue, at home covid negative prior to arrival to [facility], chest tightness and it feels dry when cough fit per pt. No fever. Little dizziness and headaches.         ICD-10-CM    1. Influenza B  J10.1 oseltamivir (TAMIFLU) 75 MG capsule      2. Throat pain  R07.0 Streptococcus A Rapid Screen w/Reflex to PCR - Clinic Collect     Group A Streptococcus PCR Throat Swab      3. Fever, unspecified fever cause  R50.9 Influenza A & B Antigen - Clinic Collect          Patient Instructions   1. Take Tylenol or Ibuprofen as needed for fever relief.   2. Take Tamiflu, follow directions on prescription.  3. Increase fluids and rest.  4. Influenza is typically considered contagious one day prior and 5-7 days after your symptoms begin. I recommend returning to work/school after you are fever free for 24 hours. Continue to practice good hand hygiene and cough coverage well after you are fever free.   5. Follow up if you develop fever that can not be controlled, difficulty breathing, or severe dehydration.    Influenza  Influenza ( the flu ) is an infection that affects your respiratory tract (the mouth, nose, and lungs, and the passages between them). Unlike a cold, the flu can make you very ill. And it can lead to pneumonia, a serious lung infection. For some people, especially older adults, young children, and people with certain chronic conditions, the flu can have serious complications and even be fatal.  How Does the Flu Spread?  The flu is caused by viruses. The viruses spread through the air in droplets when someone who has the flu coughs, sneezes, laughs, or talks. You can become infected when you inhale these viruses directly. You can also become infected when you touch a surface on which the droplets have landed and then transfer the germs to your eyes, nose, or mouth. Touching used tissues, or sharing utensils, drinking glasses, or a toothbrush with an infected  person can expose you to flu viruses, too.  What Are the Symptoms of the Flu?  Flu symptoms tend to come on quickly and may last a few days to a few weeks. They include:  Fever usually higher than 101 F  (38.3 C) and chills  Sore throat and headache  Dry cough  Runny nose  Tiredness and weakness  Muscle aches  Factors That Can Make Flu Worse  For some people, the flu can be very serious. The risk of complications is greater for:  Children under age 5.  Adults 65 years of age and older.  People with a chronic illness, such as diabetes or heart, kidney, or lung disease.  People who live in a nursing home or long-term care facility.   How Is the Flu Treated?  Influenza usually improves after 7 days or so. In some cases, your health care provider may prescribe an antiviral medication. This may help you get well sooner. For the medication to help, you need to take it as soon as possible (ideally within 48 hours) after your symptoms start. If you develop pneumonia or other serious illness, hospital care may be needed.  Easing Flu Symptoms  Drink lots of fluids such as water, juice, and warm soup. A good rule is to drink enough so that you urinate your normal amount.  Get plenty of rest.  Ask your health care provider what to take for fever and pain.  Call your provider if your fever rises over 101 F (38.3 C) or you become dizzy, lightheaded, or short of breath.     HPI:  Kaleigh Mathews is a 43 year old female who presents today complaining of cough x 2 days. Patient also having fatigue, chills, body aches x 2 days. Patient also having HA and dizziness. Patient had a negative COVID test today. She has been taking Theraflu. Per chart review thomas has DM2 per last Hemoglobin A1C.     History obtained from the patient.    Problem List:  2024-02: Elevated hemoglobin A1c  2024-02: Elevated blood sugar  2024-02: History of diet controlled gestational diabetes mellitus   (GDM)  2023-12: Unwanted fertility  2019-09: CPD (cephalo-pelvic  disproportion)  Acute upper respiratory infection  Acute pharyngitis  Anemia  Hydronephrosis      Past Medical History:   Diagnosis Date    Anemia     GDM (gestational diabetes mellitus)        Social History     Tobacco Use    Smoking status: Never    Smokeless tobacco: Never   Substance Use Topics    Alcohol use: Never       Review of Systems   Constitutional:  Positive for chills, fatigue and fever.        (+) body aches   Respiratory:  Positive for cough.    Neurological:  Positive for headaches.       Vitals:    02/20/24 1936   BP: 123/81   Pulse: 118   Resp: 24   Temp: (!) 101.8  F (38.8  C)   TempSrc: Tympanic   SpO2: 95%   Weight: 62.9 kg (138 lb 9.6 oz)       Physical Exam  Vitals and nursing note reviewed.   Constitutional:       General: She is not in acute distress.     Appearance: She is not toxic-appearing or diaphoretic.   HENT:      Head: Normocephalic and atraumatic.      Right Ear: Tympanic membrane, ear canal and external ear normal.      Left Ear: Tympanic membrane, ear canal and external ear normal.      Mouth/Throat:      Mouth: Mucous membranes are moist.      Pharynx: No oropharyngeal exudate or posterior oropharyngeal erythema.   Eyes:      Conjunctiva/sclera: Conjunctivae normal.   Cardiovascular:      Rate and Rhythm: Normal rate and regular rhythm.      Heart sounds: No murmur heard.  Pulmonary:      Effort: Pulmonary effort is normal. No respiratory distress.      Breath sounds: Normal breath sounds. No stridor. No wheezing, rhonchi or rales.   Lymphadenopathy:      Cervical: No cervical adenopathy.   Neurological:      Mental Status: She is alert.   Psychiatric:         Mood and Affect: Mood normal.         Behavior: Behavior normal.         Thought Content: Thought content normal.         Judgment: Judgment normal.         Results:  Results for orders placed or performed in visit on 02/20/24   Streptococcus A Rapid Screen w/Reflex to PCR - Clinic Collect     Status: Normal    Specimen:  Throat; Swab   Result Value Ref Range    Group A Strep antigen Negative Negative   Influenza A & B Antigen - Clinic Collect     Status: Abnormal    Specimen: Nose; Swab   Result Value Ref Range    Influenza A antigen Negative Negative    Influenza B antigen Positive (A) Negative    Narrative    Test results must be correlated with clinical data. If necessary, results should be confirmed by a molecular assay or viral culture.         At the end of the encounter, I discussed results, diagnosis, medications. Discussed red flags for immediate return to clinic/ER, as well as indications for follow up if no improvement. Patient understood and agreed to plan. Patient was stable for discharge.

## 2024-02-26 ENCOUNTER — APPOINTMENT (OUTPATIENT)
Dept: INTERPRETER SERVICES | Facility: CLINIC | Age: 44
End: 2024-02-26
Payer: COMMERCIAL

## 2024-02-27 ENCOUNTER — APPOINTMENT (OUTPATIENT)
Dept: INTERPRETER SERVICES | Facility: CLINIC | Age: 44
End: 2024-02-27
Payer: COMMERCIAL

## 2024-03-08 ENCOUNTER — ANCILLARY PROCEDURE (OUTPATIENT)
Dept: MAMMOGRAPHY | Facility: CLINIC | Age: 44
End: 2024-03-08
Attending: ADVANCED PRACTICE MIDWIFE
Payer: COMMERCIAL

## 2024-03-08 DIAGNOSIS — Z00.00 HEALTHCARE MAINTENANCE: ICD-10-CM

## 2024-03-08 DIAGNOSIS — Z01.818 PREOP GENERAL PHYSICAL EXAM: ICD-10-CM

## 2024-03-08 PROCEDURE — 77067 SCR MAMMO BI INCL CAD: CPT

## 2024-06-12 ENCOUNTER — OFFICE VISIT (OUTPATIENT)
Dept: FAMILY MEDICINE | Facility: CLINIC | Age: 44
End: 2024-06-12
Payer: COMMERCIAL

## 2024-06-12 VITALS
DIASTOLIC BLOOD PRESSURE: 70 MMHG | OXYGEN SATURATION: 98 % | BODY MASS INDEX: 26.06 KG/M2 | TEMPERATURE: 98.6 F | RESPIRATION RATE: 18 BRPM | WEIGHT: 138 LBS | HEART RATE: 88 BPM | SYSTOLIC BLOOD PRESSURE: 114 MMHG | HEIGHT: 61 IN

## 2024-06-12 DIAGNOSIS — R73.9 ELEVATED BLOOD SUGAR: ICD-10-CM

## 2024-06-12 DIAGNOSIS — Z13.220 SCREENING FOR HYPERLIPIDEMIA: ICD-10-CM

## 2024-06-12 DIAGNOSIS — Z76.89 ENCOUNTER TO ESTABLISH CARE: Primary | ICD-10-CM

## 2024-06-12 DIAGNOSIS — R74.8 ELEVATED LIVER ENZYMES: ICD-10-CM

## 2024-06-12 LAB
ALBUMIN SERPL BCG-MCNC: 4.4 G/DL (ref 3.5–5.2)
ALP SERPL-CCNC: 147 U/L (ref 40–150)
ALT SERPL W P-5'-P-CCNC: 50 U/L (ref 0–50)
ANION GAP SERPL CALCULATED.3IONS-SCNC: 12 MMOL/L (ref 7–15)
AST SERPL W P-5'-P-CCNC: 40 U/L (ref 0–45)
BILIRUB SERPL-MCNC: 0.7 MG/DL
BUN SERPL-MCNC: 11.2 MG/DL (ref 6–20)
CALCIUM SERPL-MCNC: 9.4 MG/DL (ref 8.6–10)
CHLORIDE SERPL-SCNC: 100 MMOL/L (ref 98–107)
CHOLEST SERPL-MCNC: 196 MG/DL
CREAT SERPL-MCNC: 0.43 MG/DL (ref 0.51–0.95)
DEPRECATED HCO3 PLAS-SCNC: 25 MMOL/L (ref 22–29)
EGFRCR SERPLBLD CKD-EPI 2021: >90 ML/MIN/1.73M2
FASTING STATUS PATIENT QL REPORTED: ABNORMAL
FASTING STATUS PATIENT QL REPORTED: ABNORMAL
GLUCOSE SERPL-MCNC: 252 MG/DL (ref 70–99)
HBA1C MFR BLD: 11.2 % (ref 0–5.6)
HDLC SERPL-MCNC: 55 MG/DL
LDLC SERPL CALC-MCNC: 104 MG/DL
NONHDLC SERPL-MCNC: 141 MG/DL
POTASSIUM SERPL-SCNC: 3.8 MMOL/L (ref 3.4–5.3)
PROT SERPL-MCNC: 8.2 G/DL (ref 6.4–8.3)
SODIUM SERPL-SCNC: 137 MMOL/L (ref 135–145)
TRIGL SERPL-MCNC: 186 MG/DL

## 2024-06-12 PROCEDURE — 99213 OFFICE O/P EST LOW 20 MIN: CPT

## 2024-06-12 PROCEDURE — 83036 HEMOGLOBIN GLYCOSYLATED A1C: CPT

## 2024-06-12 PROCEDURE — T1013 SIGN LANG/ORAL INTERPRETER: HCPCS | Mod: U4 | Performed by: INTERPRETER

## 2024-06-12 PROCEDURE — 80061 LIPID PANEL: CPT

## 2024-06-12 PROCEDURE — 36415 COLL VENOUS BLD VENIPUNCTURE: CPT

## 2024-06-12 PROCEDURE — 80053 COMPREHEN METABOLIC PANEL: CPT

## 2024-06-12 ASSESSMENT — ENCOUNTER SYMPTOMS
ABDOMINAL PAIN: 0
DYSPHORIC MOOD: 0
POLYDIPSIA: 0
CONSTIPATION: 0
POLYPHAGIA: 0
ARTHRALGIAS: 0
COLOR CHANGE: 0
SHORTNESS OF BREATH: 0
JOINT SWELLING: 0
BLOOD IN STOOL: 0
MYALGIAS: 0
DIARRHEA: 0

## 2024-06-12 ASSESSMENT — PAIN SCALES - GENERAL: PAINLEVEL: NO PAIN (0)

## 2024-06-12 NOTE — PROGRESS NOTES
"  Assessment & Plan     Encounter to establish care  On exam, pleasant 43 year old patient. History of   Past Medical History:   Diagnosis Date    Anemia     GDM (gestational diabetes mellitus)    No acute or concerning findings on today's physical exam. Vital signs at goal. Declines PAP, scheduled for future visit. Declines COVID vaccine.   - PRIMARY CARE FOLLOW-UP SCHEDULING; Future    Elevated blood sugar  Elevated blood sugar and A1C. Patient reports she does not have diabetes. Would like re-testing. Discussed that we will likely have to follow-up with this result if still elevated and discuss medications. Patient agrees with this. Does not check blood sugars. No excessive thirst, hunger, and urination.   - Hemoglobin A1c; Future    Screening for hyperlipidemia  Discussed recommended screening, patient agrees, ordered.   - Lipid Profile (Chol, Trig, HDL, LDL calc); Future    Subjective   May is a 43 year old, presenting for the following health issues:  Establish Care      6/12/2024     8:58 AM   Additional Questions   Roomed by krystyna     History of Present Illness       Reason for visit:  Establish care/ diabetes    She eats 2-3 servings of fruits and vegetables daily.She consumes 1 sweetened beverage(s) daily.She exercises with enough effort to increase her heart rate 9 or less minutes per day.  She exercises with enough effort to increase her heart rate 3 or less days per week.   She is taking medications regularly.     [facility]  used for visit.     Had a high blood sugar at the clinic, ate a large meal right before and reports she \"does not have diabetes.\" No excessive thirst, hunger or urination. History of gestational DM. Not aware of another positive diabetes test.     Today ate at 4 am when she got home from work she ate.     Has her menses today, this comes every month. Not too heavy not too painful. Declines PAP today.     Seven children. Did lose two children, sons. The passed away when they " "were in [place] due to illness.     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 Works two job.     Review of Systems   Respiratory:  Negative for shortness of breath.    Cardiovascular:  Negative for chest pain.   Gastrointestinal:  Negative for abdominal pain, blood in stool, constipation and diarrhea.   Endocrine: Negative for polydipsia, polyphagia and polyuria.   Musculoskeletal:  Negative for arthralgias, joint swelling and myalgias.   Skin:  Negative for color change and rash.   Psychiatric/Behavioral:  Negative for dysphoric mood.          Objective    /70 (BP Location: Right arm, Patient Position: Sitting)   Pulse 88   Temp 98.6  F (37  C) (Oral)   Resp 18   Ht 1.549 m (5' 1\")   Wt 62.6 kg (138 lb)   LMP 06/09/2024 (Exact Date)   SpO2 98%   BMI 26.07 kg/m    Body mass index is 26.07 kg/m .  Physical Exam  Vitals reviewed.   Constitutional:       General: She is not in acute distress.  HENT:      Right Ear: Tympanic membrane, ear canal and external ear normal.      Left Ear: Tympanic membrane, ear canal and external ear normal.   Eyes:      Extraocular Movements: Extraocular movements intact.      Pupils: Pupils are equal, round, and reactive to light.   Cardiovascular:      Rate and Rhythm: Normal rate and regular rhythm.      Pulses: Normal pulses.      Heart sounds: Normal heart sounds. No murmur heard.  Pulmonary:      Effort: Pulmonary effort is normal. No respiratory distress.      Breath sounds: No wheezing.   Abdominal:      General: Abdomen is flat. Bowel sounds are normal. There is no distension.   Musculoskeletal:         General: Normal range of motion.      Cervical back: Normal range of motion. No rigidity.      Right lower leg: No edema.      Left lower leg: No edema.   Lymphadenopathy:      Cervical: No cervical adenopathy.   Neurological:      General: No focal deficit present.      Mental Status: She is alert.      Cranial Nerves: No cranial nerve " deficit.      Sensory: No sensory deficit.      Motor: No weakness.      Gait: Gait normal.   Psychiatric:         Mood and Affect: Mood normal.         Thought Content: Thought content normal.        At the end of the visit, I confirmed understanding of what was discussed. May has no further questions or concerns that were brought up at this time.       Ko Matt DNP, APRN, FNP-C

## 2024-06-13 ENCOUNTER — VIRTUAL VISIT (OUTPATIENT)
Dept: FAMILY MEDICINE | Facility: CLINIC | Age: 44
End: 2024-06-13
Payer: COMMERCIAL

## 2024-06-13 DIAGNOSIS — E11.65 TYPE 2 DIABETES MELLITUS WITH HYPERGLYCEMIA, WITHOUT LONG-TERM CURRENT USE OF INSULIN (H): Primary | ICD-10-CM

## 2024-06-13 PROBLEM — E11.9 DIABETES MELLITUS, TYPE 2 (H): Status: ACTIVE | Noted: 2024-06-13

## 2024-06-13 PROCEDURE — 99442 PR PHYSICIAN TELEPHONE EVALUATION 11-20 MIN: CPT | Mod: 93

## 2024-06-13 RX ORDER — METFORMIN HCL 500 MG
1000 TABLET, EXTENDED RELEASE 24 HR ORAL 2 TIMES DAILY WITH MEALS
Qty: 360 TABLET | Refills: 0 | Status: SHIPPED | OUTPATIENT
Start: 2024-07-13 | End: 2024-09-16

## 2024-06-13 RX ORDER — METFORMIN HCL 500 MG
TABLET, EXTENDED RELEASE 24 HR ORAL
Qty: 85 TABLET | Refills: 0 | Status: SHIPPED | OUTPATIENT
Start: 2024-06-13 | End: 2024-07-13

## 2024-06-13 NOTE — PROGRESS NOTES
May is a 43 year old who is being evaluated via a billable telephone visit.    What phone number would you like to be contacted at? [phone]  How would you like to obtain your AVS? Mail a copy  Originating Location (pt. Location): Home  Distant Location (provider location):  On-site    Assessment & Plan     Type 2 diabetes mellitus with hyperglycemia, without long-term current use of insulin (H)  Uncontrolled, new diagnosis.   Complications include: Hyperglycemia, otherwise asymptomatic.   Does not want to check blood sugars at home.   We will start on Metformin and Jardiance at this time as patient has a phobia of needles. Discussed side effect of these medications. Encouraged that if she can not tolerate them, she call the clinic and we can change therapies, rather than stopping the medication due to how high her blood sugars are.   Recheck A1C in 3 months.   Lipid panel checked yesterday.   Microalbumin check at next in person visit.   Increase dietary efforts and physical activity. Discussed decreasing serving size of rice as well as trying to add in brown rice or cauliflower rice instead of always eating white rice. She will continue to work on this. Declines diabetes education. Discussed increasing daily exercise.       Lashon Farris is a 43 year old, presenting for the following health issues:  Diabetes (Discuss diabetes. Patient states she is doing fine and has no problem.)      6/13/2024     8:48 AM   Additional Questions   Roomed by Jose F ROCHA MA   Accompanied by Self     HPI     Elevated A1C. Patient feels fine and wonders if medications are needed because of this. Does admit they typically they eat a lot of rice, vegetables, and meat.  Feels like she is eating more than 2 cups of white rice per day. Had gestational diabetes.     Review of Systems  Negative      Objective           Physical Exam   General: Alert and no distress //Respiratory: No audible wheeze, cough, or shortness of breath //  Psychiatric:  Appropriate affect, tone, and pace of words        Phone call duration: 20 minutes      At the end of the visit, I confirmed understanding of what was discussed. May has no further questions or concerns that were brought up at this time.         Ko Matt DNP, APRN, FNP-C

## 2024-06-14 ENCOUNTER — TELEPHONE (OUTPATIENT)
Dept: FAMILY MEDICINE | Facility: CLINIC | Age: 44
End: 2024-06-14
Payer: COMMERCIAL

## 2024-06-14 ENCOUNTER — APPOINTMENT (OUTPATIENT)
Dept: INTERPRETER SERVICES | Facility: CLINIC | Age: 44
End: 2024-06-14
Payer: COMMERCIAL

## 2024-06-14 NOTE — LETTER
"June 14, 2024      May Bisi  [address]  [place] [address]        Dear ,    We are writing to inform you of your test results.    \"May,     Cholesterol levels were a little high. The diet changes we discussed yesterday should help lower this. When you come in for your diabetes follow-up we will recheck the cholesterol levels as well, so please come with nothing to eat or drink besides water for the 6 hours before. Labs otherwise look good outside of the elevated blood sugar and A1C. Kidney function and liver enzymes are without concern.     Please do not hesitate to reach out with any questions or concerns,    Ko Matt DNP, APRN, FNP-C\"    Resulted Orders   Hemoglobin A1c   Result Value Ref Range    Hemoglobin A1C 11.2 (H) 0.0 - 5.6 %      Comment:      Normal <5.7%   Prediabetes 5.7-6.4%    Diabetes 6.5% or higher     Note: Adopted from ADA consensus guidelines.    Narrative    Results consistent with previous, repeat testing unnecessary    Lipid Profile (Chol, Trig, HDL, LDL calc)   Result Value Ref Range    Cholesterol 196 <200 mg/dL    Triglycerides 186 (H) <150 mg/dL    Direct Measure HDL 55 >=50 mg/dL    LDL Cholesterol Calculated 104 (H) <=100 mg/dL    Non HDL Cholesterol 141 (H) <130 mg/dL    Patient Fasting > 8hrs? Unknown     Narrative    Cholesterol  Desirable:  <200 mg/dL    Triglycerides  Normal:  Less than 150 mg/dL  Borderline High:  150-199 mg/dL  High:  200-499 mg/dL  Very High:  Greater than or equal to 500 mg/dL    Direct Measure HDL  Female:  Greater than or equal to 50 mg/dL   Male:  Greater than or equal to 40 mg/dL    LDL Cholesterol  Desirable:  <100mg/dL  Above Desirable:  100-129 mg/dL   Borderline High:  130-159 mg/dL   High:  160-189 mg/dL   Very High:  >= 190 mg/dL    Non HDL Cholesterol  Desirable:  130 mg/dL  Above Desirable:  130-159 mg/dL  Borderline High:  160-189 mg/dL  High:  190-219 mg/dL  Very High:  Greater than or equal to 220 mg/dL   Comprehensive " metabolic panel (BMP + Alb, Alk Phos, ALT, AST, Total. Bili, TP)   Result Value Ref Range    Sodium 137 135 - 145 mmol/L      Comment:      Reference intervals for this test were updated on 09/26/2023 to more accurately reflect our healthy population. There may be differences in the flagging of prior results with similar values performed with this method. Interpretation of those prior results can be made in the context of the updated reference intervals.     Potassium 3.8 3.4 - 5.3 mmol/L    Carbon Dioxide (CO2) 25 22 - 29 mmol/L    Anion Gap 12 7 - 15 mmol/L    Urea Nitrogen 11.2 6.0 - 20.0 mg/dL    Creatinine 0.43 (L) 0.51 - 0.95 mg/dL    GFR Estimate >90 >60 mL/min/1.73m2    Calcium 9.4 8.6 - 10.0 mg/dL    Chloride 100 98 - 107 mmol/L    Glucose 252 (H) 70 - 99 mg/dL    Alkaline Phosphatase 147 40 - 150 U/L    AST 40 0 - 45 U/L      Comment:      Reference intervals for this test were updated on 6/12/2023 to more accurately reflect our healthy population. There may be differences in the flagging of prior results with similar values performed with this method. Interpretation of those prior results can be made in the context of the updated reference intervals.    ALT 50 0 - 50 U/L      Comment:      Reference intervals for this test were updated on 6/12/2023 to more accurately reflect our healthy population. There may be differences in the flagging of prior results with similar values performed with this method. Interpretation of those prior results can be made in the context of the updated reference intervals.      Protein Total 8.2 6.4 - 8.3 g/dL    Albumin 4.4 3.5 - 5.2 g/dL    Bilirubin Total 0.7 <=1.2 mg/dL    Patient Fasting > 8hrs? Unknown        If you have any questions or concerns, please call the clinic at the number listed above.       Sincerely,  Lior Matt

## 2024-06-14 NOTE — TELEPHONE ENCOUNTER
[place] [facility] [place] and placed in mail also.     ----- Message from FREDERIC Gomez CNP sent at 6/14/2024  6:17 AM CDT -----  Please call,     May,     Cholesterol levels were a little high. The diet changes we discussed yesterday should help lower this. When you come in for your diabetes follow-up we will recheck the cholesterol levels as well, so please come with nothing to eat or drink besides water for the 6 hours before. Labs otherwise look good outside of the elevated blood sugar and A1C. Kidney function and liver enzymes are without concern.     Please do not hesitate to reach out with any questions or concerns,    Ko Matt DNP, FREDERIC, FNP-C

## 2024-07-11 DIAGNOSIS — E11.65 TYPE 2 DIABETES MELLITUS WITH HYPERGLYCEMIA, WITHOUT LONG-TERM CURRENT USE OF INSULIN (H): ICD-10-CM

## 2024-07-11 NOTE — PROGRESS NOTES
Please call patient and see how she is doing on the Jardiance. If she is doing well, please let her know that I sent in a higher dose of Jardiance for her to start taking. She can continue on the metformin and will now be on 25 mg of Jardiance instead of 10 mg.     Thank you,     Ko Matt DNP, APRN, FNP-C

## 2024-07-16 ENCOUNTER — TRANSFERRED RECORDS (OUTPATIENT)
Dept: MULTI SPECIALTY CLINIC | Facility: CLINIC | Age: 44
End: 2024-07-16

## 2024-07-16 LAB — RETINOPATHY: NORMAL

## 2024-07-19 ENCOUNTER — OFFICE VISIT (OUTPATIENT)
Dept: FAMILY MEDICINE | Facility: CLINIC | Age: 44
End: 2024-07-19
Payer: COMMERCIAL

## 2024-07-19 VITALS
SYSTOLIC BLOOD PRESSURE: 110 MMHG | HEIGHT: 61 IN | BODY MASS INDEX: 26.06 KG/M2 | WEIGHT: 138 LBS | OXYGEN SATURATION: 97 % | HEART RATE: 78 BPM | RESPIRATION RATE: 16 BRPM | TEMPERATURE: 98.3 F | DIASTOLIC BLOOD PRESSURE: 80 MMHG

## 2024-07-19 DIAGNOSIS — Z12.4 CERVICAL CANCER SCREENING: Primary | ICD-10-CM

## 2024-07-19 DIAGNOSIS — L30.9 VULVAR DERMATITIS: ICD-10-CM

## 2024-07-19 PROCEDURE — G0145 SCR C/V CYTO,THINLAYER,RESCR: HCPCS

## 2024-07-19 PROCEDURE — 87624 HPV HI-RISK TYP POOLED RSLT: CPT

## 2024-07-19 PROCEDURE — 99213 OFFICE O/P EST LOW 20 MIN: CPT

## 2024-07-19 RX ORDER — BENZOCAINE/MENTHOL 6 MG-10 MG
LOZENGE MUCOUS MEMBRANE 2 TIMES DAILY
Qty: 15 G | Refills: 1 | Status: SHIPPED | OUTPATIENT
Start: 2024-07-19

## 2024-07-19 ASSESSMENT — PAIN SCALES - GENERAL: PAINLEVEL: NO PAIN (0)

## 2024-07-19 NOTE — PROGRESS NOTES
"  Assessment & Plan     Cervical cancer screening  Due, no atypical findings today. No history of abnormal result, repeat in 5 years if normal result.   - Pap Screen with HPV - Recommended Age 30 - 65 Years  - Gynecologic Cytology (PAP)    Vulvar dermatitis  Itching it to the labia majora as identified by patient during pap. No lesions, dermatitis, erythema, or other atypical findings today. Does not shave the area. Recommend low dose steroid cream applied only externally to the itching when needed. Discussed risk of overuse and to not use for more than the few days of itching around menses. If no benefit, follow-up.   - hydrocortisone (CORTAID) 1 % external cream; Apply topically 2 times daily For itching.    Subjective   May is a 43 year old, presenting for the following health issues:  PAP (Pap today. Patient would like to discuss vaginal itchiness. She usually gets it before and after having her periods. )        7/19/2024     3:04 PM   Additional Questions   Roomed by Jose F ROCHA MA   Accompanied by Self     HPI     Itchiness of the vagina before the menses and after.     No abnormal discharge or drainage. No pelvic pain.     Period is monthly.     She does notice a lump on the inside when this itchiness happens.     No concerns for sexually transmitted disease.     Review of Systems   Genitourinary:  Negative for difficulty urinating, dyspareunia, dysuria, genital sores, menstrual problem, pelvic pain, vaginal bleeding, vaginal discharge and vaginal pain.        Vaginal itching during menses           Objective    /80 (BP Location: Right arm, Patient Position: Sitting, Cuff Size: Adult Regular)   Pulse 78   Temp 98.3  F (36.8  C) (Oral)   Resp 16   Ht 1.537 m (5' 0.5\")   Wt 62.6 kg (138 lb)   LMP 06/19/2024 (Exact Date)   SpO2 97%   BMI 26.51 kg/m    Body mass index is 26.51 kg/m .  Physical Exam  Constitutional:       General: She is not in acute distress.  Abdominal:      Hernia: There is no " hernia in the left inguinal area or right inguinal area.   Genitourinary:     Pubic Area: No rash.       Labia:         Right: No rash, tenderness, lesion or injury.         Left: No rash, tenderness, lesion or injury.       Urethra: No prolapse.      Vagina: No signs of injury. No vaginal discharge, erythema, tenderness, bleeding or lesions.      Cervix: No cervical motion tenderness, discharge, friability or erythema.      Uterus: Not deviated.       Adnexa:         Right: No mass, tenderness or fullness.          Left: No mass, tenderness or fullness.     Lymphadenopathy:      Lower Body: No right inguinal adenopathy. No left inguinal adenopathy.   Neurological:      General: No focal deficit present.      Mental Status: She is alert. Mental status is at baseline.   Psychiatric:         Mood and Affect: Mood normal.         Thought Content: Thought content normal.        At the end of the visit, I confirmed understanding of what was discussed. May has no further questions or concerns that were brought up at this time.        Ko Matt DNP, APRN, FNP-C

## 2024-07-22 LAB
HPV HR 12 DNA CVX QL NAA+PROBE: NEGATIVE
HPV16 DNA CVX QL NAA+PROBE: NEGATIVE
HPV18 DNA CVX QL NAA+PROBE: NEGATIVE
HUMAN PAPILLOMA VIRUS FINAL DIAGNOSIS: NORMAL

## 2024-07-25 LAB
BKR LAB AP GYN ADEQUACY: NORMAL
BKR LAB AP GYN INTERPRETATION: NORMAL
BKR LAB AP LMP: NORMAL
BKR LAB AP PREVIOUS ABNORMAL: NORMAL
PATH REPORT.COMMENTS IMP SPEC: NORMAL
PATH REPORT.COMMENTS IMP SPEC: NORMAL
PATH REPORT.RELEVANT HX SPEC: NORMAL

## 2024-08-13 ASSESSMENT — ENCOUNTER SYMPTOMS
DIFFICULTY URINATING: 0
DYSURIA: 0

## 2024-09-16 DIAGNOSIS — E11.65 TYPE 2 DIABETES MELLITUS WITH HYPERGLYCEMIA, WITHOUT LONG-TERM CURRENT USE OF INSULIN (H): ICD-10-CM

## 2024-09-16 RX ORDER — METFORMIN HCL 500 MG
1000 TABLET, EXTENDED RELEASE 24 HR ORAL 2 TIMES DAILY WITH MEALS
Qty: 360 TABLET | Refills: 0 | Status: SHIPPED | OUTPATIENT
Start: 2024-09-16

## 2024-09-16 NOTE — TELEPHONE ENCOUNTER
FAX [facility] Prescription Refill Request    metFORMIN (GLUCOPHAGE XR) 500 MG 24 hr tablet 360 tablet 0 7/13/2024 -- No   Sig - Route: Take 2 tablets (1,000 mg) by mouth 2 times daily (with meals) - Oral   Sent to pharmacy as: metFORMIN HCl  MG Oral Tablet Extended Release 24 Hour (GLUCOPHAGE XR)   Class: E-Prescribe   Order: [id number]   E-Prescribing Status: Receipt confirmed by pharmacy (6/13/2024  9:27 AM CDT)

## 2024-09-20 ENCOUNTER — OFFICE VISIT (OUTPATIENT)
Dept: FAMILY MEDICINE | Facility: CLINIC | Age: 44
End: 2024-09-20
Payer: COMMERCIAL

## 2024-09-20 VITALS
HEART RATE: 82 BPM | SYSTOLIC BLOOD PRESSURE: 118 MMHG | TEMPERATURE: 98.8 F | OXYGEN SATURATION: 96 % | DIASTOLIC BLOOD PRESSURE: 58 MMHG | BODY MASS INDEX: 26.09 KG/M2 | HEIGHT: 61 IN | WEIGHT: 138.2 LBS | RESPIRATION RATE: 16 BRPM

## 2024-09-20 DIAGNOSIS — Z23 ENCOUNTER FOR IMMUNIZATION: ICD-10-CM

## 2024-09-20 DIAGNOSIS — E11.9 TYPE 2 DIABETES MELLITUS WITHOUT COMPLICATION, WITHOUT LONG-TERM CURRENT USE OF INSULIN (H): Primary | ICD-10-CM

## 2024-09-20 LAB
CREAT UR-MCNC: 28.4 MG/DL
EST. AVERAGE GLUCOSE BLD GHB EST-MCNC: 180 MG/DL
HBA1C MFR BLD: 7.9 % (ref 0–5.6)
MICROALBUMIN UR-MCNC: <12 MG/L
MICROALBUMIN/CREAT UR: NORMAL MG/G{CREAT}

## 2024-09-20 PROCEDURE — 90656 IIV3 VACC NO PRSV 0.5 ML IM: CPT

## 2024-09-20 PROCEDURE — T1013 SIGN LANG/ORAL INTERPRETER: HCPCS | Mod: U4 | Performed by: INTERPRETER

## 2024-09-20 PROCEDURE — 90480 ADMN SARSCOV2 VAC 1/ONLY CMP: CPT

## 2024-09-20 PROCEDURE — 91320 SARSCV2 VAC 30MCG TRS-SUC IM: CPT

## 2024-09-20 PROCEDURE — 82570 ASSAY OF URINE CREATININE: CPT

## 2024-09-20 PROCEDURE — 83036 HEMOGLOBIN GLYCOSYLATED A1C: CPT

## 2024-09-20 PROCEDURE — 99213 OFFICE O/P EST LOW 20 MIN: CPT | Mod: 25

## 2024-09-20 PROCEDURE — 90471 IMMUNIZATION ADMIN: CPT

## 2024-09-20 PROCEDURE — 36415 COLL VENOUS BLD VENIPUNCTURE: CPT

## 2024-09-20 PROCEDURE — 82043 UR ALBUMIN QUANTITATIVE: CPT

## 2024-09-20 ASSESSMENT — ENCOUNTER SYMPTOMS
FATIGUE: 0
LIGHT-HEADEDNESS: 0
POLYPHAGIA: 0
DIZZINESS: 0
HEADACHES: 0
POLYDIPSIA: 0

## 2024-09-20 NOTE — PROGRESS NOTES
"  Assessment & Plan     Type 2 diabetes mellitus without complication, without long-term current use of insulin (H)  Complications include elevated A1C at 11.2.   Not checking blood sugars at home.   Repeat A1C today. Taking only Jardiance 25 mg daily.   Repeat A1C is 7.9. Significant improvement with lifestyle changes. Recommend continued changes and repeat in three months. If still elevated, add in second therapy.   Lipid panel checked less than a year ago. Repeat at next visit to assess if lifestyle changes have lowered LDL to goal (less than 70).   Microalbumin checked today.   Continue dietary efforts and physical activity.  Blood pressure today:  118/58  Vaccines due today discussed.    - Albumin Random Urine Quantitative with Creat Ratio  - HEMOGLOBIN A1C    Encounter for immunization  - INFLUENZA VACCINE,SPLIT VIRUS,TRIVALENT,PF(FLUZONE)  - COVID-19 12+ (PFIZER)    BMI  Estimated body mass index is 26.55 kg/m  as calculated from the following:    Height as of this encounter: 1.537 m (5' 0.5\").    Weight as of this encounter: 62.7 kg (138 lb 3.2 oz).   Weight management plan: Discussed healthy diet and exercise guidelines      Lashon Farris is a 43 year old, presenting for the following health issues:  Diabetes (Follow up)        9/20/2024     2:49 PM   Additional Questions   Roomed by Maddie HANKS MA   Accompanied by Daughter     Via the Health Maintenance questionnaire, the patient has reported the following services have been completed -Eye Exam: [facility] 2024-07-16, this information has been sent to the abstraction team.  History of Present Illness       Diabetes:   She presents for follow up of diabetes.    She is not checking blood glucose.         She has no concerns regarding her diabetes at this time.   She is not experiencing numbness or burning in feet, excessive thirst, blurry vision, weight changes or redness, sores or blisters on feet. The patient has had a diabetic eye exam in the last 12 " "months. Eye exam performed on 2-3 months ago. Location of last eye exam [facility].       Jardiance 25 mg tablet. Taking daily.     Metformin 500 mg tablet. Took for one week. Was causing Fatigue and HA so stopped taking medication, noted improvement in symptoms. Does not want to take this medication.     Not checking blood sugars at home. No polydipsia, polyuria, polyphagia.     Cut down on rice intake by about 50%. Cut down on sugary drinks as well. Feels that she will be able to sustain this change. Works two jobs, minimal time. Friday, Saturday, Sundays walking at the park.     Review of Systems   Constitutional:  Negative for fatigue.   Endocrine: Negative for polydipsia, polyphagia and polyuria.   Neurological:  Negative for dizziness, light-headedness and headaches.         Objective    /58 (BP Location: Right arm, Patient Position: Sitting, Cuff Size: Adult Regular)   Pulse 82   Temp 98.8  F (37.1  C) (Oral)   Resp 16   Ht 1.537 m (5' 0.5\")   Wt 62.7 kg (138 lb 3.2 oz)   LMP 09/09/2024 (Exact Date)   SpO2 96%   BMI 26.55 kg/m    Body mass index is 26.55 kg/m .  Physical Exam  Constitutional:       General: She is not in acute distress.  Cardiovascular:      Rate and Rhythm: Normal rate and regular rhythm.      Heart sounds: Normal heart sounds.   Pulmonary:      Effort: No respiratory distress.   Musculoskeletal:      Right lower leg: No edema.      Left lower leg: No edema.   Neurological:      General: No focal deficit present.      Mental Status: She is alert. Mental status is at baseline.   Psychiatric:         Mood and Affect: Mood normal.         Thought Content: Thought content normal.        At the end of the visit, I confirmed understanding of what was discussed. May has no further questions or concerns that were brought up at this time.      Ko Matt DNP, APRN, FNP-C    "

## 2024-10-29 DIAGNOSIS — E11.65 TYPE 2 DIABETES MELLITUS WITH HYPERGLYCEMIA, WITHOUT LONG-TERM CURRENT USE OF INSULIN (H): ICD-10-CM
